# Patient Record
Sex: MALE | URBAN - METROPOLITAN AREA
[De-identification: names, ages, dates, MRNs, and addresses within clinical notes are randomized per-mention and may not be internally consistent; named-entity substitution may affect disease eponyms.]

---

## 2019-01-08 ENCOUNTER — TRANSFERRED RECORDS (OUTPATIENT)
Dept: HEALTH INFORMATION MANAGEMENT | Facility: CLINIC | Age: 34
End: 2019-01-08

## 2019-01-09 NOTE — TELEPHONE ENCOUNTER
FUTURE VISIT INFORMATION      FUTURE VISIT INFORMATION:    Date: 02/11/19    Time: 230pm    Location: [facility]  REFERRAL INFORMATION:    Referring provider:  JASON DACOSTA    Referring providers clinic:  [facility]    Reason for visit/diagnosis  lesion on sclera of R eye    RECORDS REQUESTED FROM:       Clinic name Comments Records Status Imaging Status   [facility] Notes transferred to HIM IN HIM

## 2019-02-11 ENCOUNTER — OFFICE VISIT (OUTPATIENT)
Dept: OPHTHALMOLOGY | Facility: CLINIC | Age: 34
End: 2019-02-11
Payer: COMMERCIAL

## 2019-02-11 ENCOUNTER — PRE VISIT (OUTPATIENT)
Dept: OPHTHALMOLOGY | Facility: CLINIC | Age: 34
End: 2019-02-11

## 2019-02-11 ENCOUNTER — DOCUMENTATION ONLY (OUTPATIENT)
Dept: OPHTHALMOLOGY | Facility: CLINIC | Age: 34
End: 2019-02-11

## 2019-02-11 DIAGNOSIS — H11.9 CONJUNCTIVAL LESION: Primary | ICD-10-CM

## 2019-02-11 RX ORDER — NICOTINE POLACRILEX 4 MG/1
20 GUM, CHEWING ORAL DAILY
COMMUNITY

## 2019-02-11 RX ORDER — CETIRIZINE HYDROCHLORIDE 10 MG/1
10 TABLET ORAL DAILY
COMMUNITY

## 2019-02-11 ASSESSMENT — SLIT LAMP EXAM - LIDS
COMMENTS: NORMAL
COMMENTS: NORMAL

## 2019-02-11 ASSESSMENT — CONF VISUAL FIELD
OD_NORMAL: 1
OS_NORMAL: 1
METHOD: COUNTING FINGERS

## 2019-02-11 ASSESSMENT — TONOMETRY
OS_IOP_MMHG: 19
OD_IOP_MMHG: 21
IOP_METHOD: ICARE

## 2019-02-11 ASSESSMENT — VISUAL ACUITY
OD_CC: 20/20
OS_CC: 20/20
METHOD: SNELLEN - LINEAR
CORRECTION_TYPE: CONTACTS

## 2019-02-11 NOTE — NURSING NOTE
Chief Complaints and History of Present Illnesses   Patient presents with     Consult For     Lesion on sclera OD     Chief Complaint(s) and History of Present Illness(es)     Consult For     Laterality: right eye    Onset: gradual    Onset: 5 years ago    Quality: blurred vision    Severity: moderate    Frequency: constantly    Timing: throughout the day    Course: gradually worsening    Associated symptoms: Negative for eye pain    Treatments tried: no treatments    Pain scale: 0/10    Comments: Lesion on sclera OD              Comments     Spot on sclera right eye medially, noticed 5 yrs ago. Maybe getting slightly bigger. No eye pain or irritation. Right eye gets blurry at end of day with CLs. Does not use eye drops    Crystal Perez COT 2:14 PM February 11, 2019

## 2019-02-11 NOTE — LETTER
2019         RE:  :  MRN: Melvin Cunningham  1985  [id number]     Dear Dr. Wili Rocha,    Thank you for asking me to see your patient, Melvin Cunningham, for an oculoplastic   consultation.  My assessment and plan are below.  For further details, please see my attached clinic note.      Assessment    Melvin Cunningham is a 34 year old male with the following diagnoses:   1. Conjunctival lesion - Right Eye       Pt has noted medial conj pigmented lesion near caruncle x ~5yrs  -possible slow progressive growth  - VA otherwise stable    Exam w/ right caruncular pigmented lesion, no feeder vessels    PLAN:  Excisional biopsy of pigmented lesion right eye       Again, thank you for allowing me to participate in the care of your patient.      Sincerely,    Levi Spears MD  Department of Ophthalmology and Visual Neurosciences  [facility]    CC: [address] [address]  VIA Facsimile: [phone]     Wili Rocha MD  [facility] [address] [place] [address]  VIA Facsimile: [phone]

## 2019-02-11 NOTE — PROGRESS NOTES
Met with patient to schedule surgery with Dr. Levi Spears.    Surgery was scheduled on 03/13 at [facility]  Patient will have H&P at [facility]  Post-Op care appointment was scheduled on 04/01  Patient is aware a / is needed day of surgery.   Patient received surgery packet has my direct contact information for any further questions.     Latrice, patient would like a cost estimate.  Please call him with information.

## 2019-02-11 NOTE — PROGRESS NOTES
Assessment    Melvin Cunningham is a 34 year old male with the following diagnoses:   1. Conjunctival lesion - Right Eye         Pt has noted medial conj pigmented lesion near caruncle x ~5yrs  -possible slow progressive growth  - VA otherwise stable    Exam w/ right caruncular pigmented lesion, no feeder vessels    PLAN:  Biopsy of pigmented lesion Right Eye      Deion Ken MD, RICARDO  Oculofacial Plastics and Orbit Surgery Fellow    Attending Physician Attestation:  Complete documentation of historical and exam elements from today's encounter can be found in the full encounter summary report (not reduplicated in this progress note).  I personally obtained the chief complaint(s) and history of present illness.  I confirmed and edited as necessary the review of systems, past medical/surgical history, family history, social history, and examination findings as documented by others; and I examined the patient myself.  I personally reviewed the relevant tests, images, and reports as documented above.  I formulated and edited as necessary the assessment and plan and discussed the findings and management plan with the patient and family. I personally reviewed the ophthalmic test(s) associated with this encounter, agree with the interpretation(s) as documented by the resident/fellow, and have edited the corresponding report(s) as necessary.   -Levi Spears MD    Today with Melvin Cunningham, I reviewed the indications, risks, benefits, and alternatives of the proposed surgical procedure including, but not limited to, failure obtain the desired result  and need for additional surgery, bleeding, infection, loss of vision, loss of the eye, and the remote possibility of permanent damage to any organ system or death with the use of anesthesia.  I provided multiple opportunities for the questions, answered all questions to the best of my ability, and confirmed that my answers and my discussion were understood.    - Levi Spears MD 2:57 PM 2/11/2019

## 2019-02-18 NOTE — PROGRESS NOTES
Patient called asking to reschedule to 02/27 at [place].   Patient was rescheduled,   Surgery packet was e-mailed due to timing.   PO was rescheduled to 03/11

## 2019-02-26 ENCOUNTER — ANESTHESIA EVENT (OUTPATIENT)
Dept: SURGERY | Facility: CLINIC | Age: 34
End: 2019-02-26
Payer: COMMERCIAL

## 2019-02-27 ENCOUNTER — HOSPITAL ENCOUNTER (OUTPATIENT)
Facility: CLINIC | Age: 34
Discharge: HOME | End: 2019-02-27
Attending: OPHTHALMOLOGY | Admitting: OPHTHALMOLOGY
Payer: COMMERCIAL

## 2019-02-27 ENCOUNTER — ANESTHESIA (OUTPATIENT)
Dept: SURGERY | Facility: CLINIC | Age: 34
End: 2019-02-27
Payer: COMMERCIAL

## 2019-02-27 VITALS
BODY MASS INDEX: 24 KG/M2 | OXYGEN SATURATION: 100 % | HEIGHT: 69 IN | TEMPERATURE: 97.5 F | WEIGHT: 162.04 LBS | HEART RATE: 68 BPM | RESPIRATION RATE: 16 BRPM | SYSTOLIC BLOOD PRESSURE: 119 MMHG | DIASTOLIC BLOOD PRESSURE: 82 MMHG

## 2019-02-27 DIAGNOSIS — Z98.890 POSTOPERATIVE EYE STATE: Primary | ICD-10-CM

## 2019-02-27 LAB — GLUCOSE BLDC GLUCOMTR-MCNC: 101 MG/DL (ref 70–99)

## 2019-02-27 PROCEDURE — 88304 TISSUE EXAM BY PATHOLOGIST: CPT | Mod: 26 | Performed by: OPHTHALMOLOGY

## 2019-02-27 PROCEDURE — 71000014 ZZH RECOVERY PHASE 1 LEVEL 2 FIRST HR: Performed by: OPHTHALMOLOGY

## 2019-02-27 PROCEDURE — 71000027 ZZH RECOVERY PHASE 2 EACH 15 MINS: Performed by: OPHTHALMOLOGY

## 2019-02-27 PROCEDURE — 36000059 ZZH SURGERY LEVEL 3 EA 15 ADDTL MIN UMMC: Performed by: OPHTHALMOLOGY

## 2019-02-27 PROCEDURE — 37000009 ZZH ANESTHESIA TECHNICAL FEE, EACH ADDTL 15 MIN: Performed by: OPHTHALMOLOGY

## 2019-02-27 PROCEDURE — 25000125 ZZHC RX 250: Performed by: OPHTHALMOLOGY

## 2019-02-27 PROCEDURE — 88341 IMHCHEM/IMCYTCHM EA ADD ANTB: CPT | Performed by: OPHTHALMOLOGY

## 2019-02-27 PROCEDURE — 25000566 ZZH SEVOFLURANE, EA 15 MIN: Performed by: OPHTHALMOLOGY

## 2019-02-27 PROCEDURE — 40000170 ZZH STATISTIC PRE-PROCEDURE ASSESSMENT II: Performed by: OPHTHALMOLOGY

## 2019-02-27 PROCEDURE — 36000057 ZZH SURGERY LEVEL 3 1ST 30 MIN - UMMC: Performed by: OPHTHALMOLOGY

## 2019-02-27 PROCEDURE — 27210794 ZZH OR GENERAL SUPPLY STERILE: Performed by: OPHTHALMOLOGY

## 2019-02-27 PROCEDURE — 25800030 ZZH RX IP 258 OP 636: Performed by: NURSE ANESTHETIST, CERTIFIED REGISTERED

## 2019-02-27 PROCEDURE — 88342 IMHCHEM/IMCYTCHM 1ST ANTB: CPT | Mod: 26 | Performed by: OPHTHALMOLOGY

## 2019-02-27 PROCEDURE — 88341 IMHCHEM/IMCYTCHM EA ADD ANTB: CPT | Mod: 26 | Performed by: OPHTHALMOLOGY

## 2019-02-27 PROCEDURE — 88342 IMHCHEM/IMCYTCHM 1ST ANTB: CPT | Performed by: OPHTHALMOLOGY

## 2019-02-27 PROCEDURE — 25000128 H RX IP 250 OP 636: Performed by: NURSE ANESTHETIST, CERTIFIED REGISTERED

## 2019-02-27 PROCEDURE — 82962 GLUCOSE BLOOD TEST: CPT

## 2019-02-27 PROCEDURE — 25000128 H RX IP 250 OP 636: Performed by: OPHTHALMOLOGY

## 2019-02-27 PROCEDURE — 37000008 ZZH ANESTHESIA TECHNICAL FEE, 1ST 30 MIN: Performed by: OPHTHALMOLOGY

## 2019-02-27 PROCEDURE — 88304 TISSUE EXAM BY PATHOLOGIST: CPT | Performed by: OPHTHALMOLOGY

## 2019-02-27 PROCEDURE — 25000125 ZZHC RX 250: Performed by: NURSE ANESTHETIST, CERTIFIED REGISTERED

## 2019-02-27 RX ORDER — PROPOFOL 10 MG/ML
INJECTION, EMULSION INTRAVENOUS PRN
Status: DISCONTINUED | OUTPATIENT
Start: 2019-02-27 | End: 2019-02-27

## 2019-02-27 RX ORDER — ONDANSETRON 2 MG/ML
INJECTION INTRAMUSCULAR; INTRAVENOUS PRN
Status: DISCONTINUED | OUTPATIENT
Start: 2019-02-27 | End: 2019-02-27

## 2019-02-27 RX ORDER — FENTANYL CITRATE 50 UG/ML
INJECTION, SOLUTION INTRAMUSCULAR; INTRAVENOUS PRN
Status: DISCONTINUED | OUTPATIENT
Start: 2019-02-27 | End: 2019-02-27

## 2019-02-27 RX ORDER — SODIUM CHLORIDE, SODIUM LACTATE, POTASSIUM CHLORIDE, CALCIUM CHLORIDE 600; 310; 30; 20 MG/100ML; MG/100ML; MG/100ML; MG/100ML
INJECTION, SOLUTION INTRAVENOUS CONTINUOUS PRN
Status: DISCONTINUED | OUTPATIENT
Start: 2019-02-27 | End: 2019-02-27

## 2019-02-27 RX ORDER — LIDOCAINE 40 MG/G
CREAM TOPICAL
Status: DISCONTINUED | OUTPATIENT
Start: 2019-02-27 | End: 2019-02-27 | Stop reason: HOSPADM

## 2019-02-27 RX ORDER — DEXAMETHASONE SODIUM PHOSPHATE 4 MG/ML
INJECTION, SOLUTION INTRA-ARTICULAR; INTRALESIONAL; INTRAMUSCULAR; INTRAVENOUS; SOFT TISSUE PRN
Status: DISCONTINUED | OUTPATIENT
Start: 2019-02-27 | End: 2019-02-27

## 2019-02-27 RX ORDER — TETRACAINE HYDROCHLORIDE 5 MG/ML
SOLUTION OPHTHALMIC PRN
Status: DISCONTINUED | OUTPATIENT
Start: 2019-02-27 | End: 2019-02-27 | Stop reason: HOSPADM

## 2019-02-27 RX ORDER — SODIUM CHLORIDE, SODIUM LACTATE, POTASSIUM CHLORIDE, CALCIUM CHLORIDE 600; 310; 30; 20 MG/100ML; MG/100ML; MG/100ML; MG/100ML
INJECTION, SOLUTION INTRAVENOUS CONTINUOUS
Status: DISCONTINUED | OUTPATIENT
Start: 2019-02-27 | End: 2019-02-27 | Stop reason: HOSPADM

## 2019-02-27 RX ORDER — HYDROCODONE BITARTRATE AND ACETAMINOPHEN 5; 325 MG/1; MG/1
1 TABLET ORAL EVERY 6 HOURS PRN
Qty: 4 TABLET | Refills: 0 | Status: SHIPPED | OUTPATIENT
Start: 2019-02-27

## 2019-02-27 RX ORDER — LIDOCAINE HYDROCHLORIDE 20 MG/ML
INJECTION, SOLUTION INFILTRATION; PERINEURAL PRN
Status: DISCONTINUED | OUTPATIENT
Start: 2019-02-27 | End: 2019-02-27

## 2019-02-27 RX ORDER — ERYTHROMYCIN 5 MG/G
OINTMENT OPHTHALMIC PRN
Status: DISCONTINUED | OUTPATIENT
Start: 2019-02-27 | End: 2019-02-27 | Stop reason: HOSPADM

## 2019-02-27 RX ORDER — NEOMYCIN POLYMYXIN B SULFATES AND DEXAMETHASONE 3.5; 10000; 1 MG/ML; [USP'U]/ML; MG/ML
1 SUSPENSION/ DROPS OPHTHALMIC 4 TIMES DAILY
Qty: 5 ML | Refills: 0 | Status: SHIPPED | OUTPATIENT
Start: 2019-02-27

## 2019-02-27 RX ADMIN — SODIUM CHLORIDE, POTASSIUM CHLORIDE, SODIUM LACTATE AND CALCIUM CHLORIDE: 600; 310; 30; 20 INJECTION, SOLUTION INTRAVENOUS at 09:35

## 2019-02-27 RX ADMIN — PROPOFOL 50 MG: 10 INJECTION, EMULSION INTRAVENOUS at 09:39

## 2019-02-27 RX ADMIN — FENTANYL CITRATE 50 MCG: 50 INJECTION, SOLUTION INTRAMUSCULAR; INTRAVENOUS at 09:46

## 2019-02-27 RX ADMIN — DEXAMETHASONE SODIUM PHOSPHATE 8 MG: 4 INJECTION, SOLUTION INTRAMUSCULAR; INTRAVENOUS at 09:44

## 2019-02-27 RX ADMIN — PROPOFOL 200 MG: 10 INJECTION, EMULSION INTRAVENOUS at 09:38

## 2019-02-27 RX ADMIN — FENTANYL CITRATE 50 MCG: 50 INJECTION, SOLUTION INTRAMUSCULAR; INTRAVENOUS at 09:38

## 2019-02-27 RX ADMIN — ONDANSETRON 4 MG: 2 INJECTION INTRAMUSCULAR; INTRAVENOUS at 09:44

## 2019-02-27 RX ADMIN — LIDOCAINE HYDROCHLORIDE 100 MG: 20 INJECTION, SOLUTION INFILTRATION; PERINEURAL at 09:38

## 2019-02-27 ASSESSMENT — MIFFLIN-ST. JEOR: SCORE: 1660.38

## 2019-02-27 NOTE — ANESTHESIA POSTPROCEDURE EVALUATION
Anesthesia POST Procedure Evaluation    Patient: Melvin Cunningham   MRN:     [id number] Gender:   male   Age:    34 year old :      1985        Preoperative Diagnosis: Conjuctival Lesion Right Eye, Pigmented Lesion   Procedure(s):  Excision Of Conjunctival Right Eye Lesion   Postop Comments: No value filed.       Anesthesia Type:  General    Reportable Event: NO     PAIN: Uncomplicated   Sign Out status: Comfortable, Well controlled pain     PONV: No PONV   Sign Out status:  No Nausea or Vomiting     Neuro/Psych: Uneventful perioperative course   Sign Out Status: Preoperative baseline; Age appropriate mentation     Airway/Resp.: Uneventful perioperative course   Sign Out Status: Non labored breathing, age appropriate RR; Resp. Status within EXPECTED Parameters     CV: Uneventful perioperative course   Sign Out status: Appropriate BP and perfusion indices; Appropriate HR/Rhythm     Disposition:   Sign Out in:  PACU  Disposition:  Phase II; Home  Recovery Course: Uneventful  Follow-Up: Not required           Last Anesthesia Record Vitals:  CRNA VITALS  2019 0936 - 2019 1036      2019             Pulse:  52    SpO2:  100 %    Resp Rate (observed):  12          Last PACU/Preop Vitals:  Vitals:    19 1100 19 1115 19 1130   BP: 128/85 119/82    Pulse: 54 68    Resp: 16     Temp:  36.4  C (97.5  F)    SpO2: 100% 100% 100%         Electronically Signed By: Britney Oconnell MD, 2019, 1:01 PM

## 2019-02-27 NOTE — OP NOTE
PREOPERATIVE DIAGNOSES: Right conjunctival mass.   POSTOPERATIVE DIAGNOSES:  Right conjunctival mass.   PROCEDURE: Right conjunctival excisional biopsy.   ANESTHESIA: General with local infiltration of a 50/50 mixture of 2% lidocaine with epinephrine and 0.5% Marcaine.   SURGEON: Subha Spears MD.  ASSISTANT:  Deion Ken MD and  Killian Aguila MD   COMPLICATIONS: None.   ESTIMATED BLOOD LOSS: Less than 5 mL.   SPECIMEN: Conjuntival mass for histopathology  HISTORY AND INDICATIONS: Melvin Cunningham presented with a  conjunctival lesion on the right side. After the risks, benefits and alternatives to the proposed procedure were explained, informed consent was obtained.   DESCRIPTION OF PROCEDURE: The patient was brought to the operating room and placed supine on the operating table.General anesthesia was given. Topical anesthetic was placed in both eyes. Subconjunctival infiltration of 2% lidocaine with epinephrine mixed 50/50 with 0.5% Marcaine was performed. The area  was prepped and draped in the typical sterile fashion for oculoplastic surgery. Attention was directed to right  side. The lower lid was retracted and a Rajwinder lid plate used to protect the globe. The conjunctival lesion was excised with a Waylon scissors. Hemostasis was obtained with bipolar cautery. Antibiotic ophthalmic ointment was placed in the eye. The patient tolerated the procedure well. Melvin J Aimeenia  Left the operating room in stable condition.     SUBHA SPEARS MD

## 2019-02-27 NOTE — DISCHARGE INSTRUCTIONS
Post-operative Instructions    Ophthalmic Plastic and Reconstructive Surgery  Levi Spears M.D.      All instructions apply to the operated eye(s) or eyelid(s)      What to expect after surgery:    There will be some swelling, bruising, and likely a black eye (even into the lower eyelids and cheeks). Also expect crusting and discharge from the eye and/or incisions.     A small amount of surface bleeding is normal for the first 48 hours after surgery.    You may notice some bloody tears for the first few days after surgery. This is normal.    Your eye(s) and eyelid(s) may be painful and tender. This is normal after surgery. Use the pain medication as prescribed. If your pain does not improve despite the medication, contact the office.    Wound care and personal care:    If a patch or bandage has been placed, please leave this in place until seen in clinic. Prevent the bandage from getting wet.     Apply ice compresses 15 minutes on 15 minutes off while awake for the first 2 days after surgery, then switch to warm compresses 4 times a day until seen by your physician.     For warm packs you can place a cup of dry uncooked rice in a clean cotton sock. Place sock in microwave 30 seconds to one minute. Next place the warm sock into a plastic bag and wrap the bag with clean warm wet washcloth and place over operated eye.      You may shower or wash your hair the day after surgery. Do not bathe or go swimming for 1 week to prevent contamination of your wounds.    Do not apply make-up to the eyes or eyelids for 2 weeks after surgery.      Activity restrictions and driving:    Avoid heavy lifting, bending, exercise or strenuous activity for 1 week after surgery.    You may resume other activities and return to work as tolerated.    You may not resume driving until have you stopped using narcotic pain medications(such as Norco, Percocet, Tylenol #3).    Medications:    Restart all your regular home medications and eye  drops today. If you take Plavix or Aspirin on a regular basis, wait for 3 days after your surgery before restarting these in order to decrease the risk of bleeding complications.    Avoid aspirin and aspirin-like medications (Motrin, Aleve, Ibuprofen, Aleshia-Webb etc) for 5 days to reduce the risk of bleeding. You may take Tylenol (acetaminophen) for pain.    In addition to your home medications, take the following post-operative medications as prescribed by your physician:      Instill eye drops (Maxitrol) four times a day until the bottle finished.     Take 1 to 2 pain pills (norco or tylenol 3 as prescribed) as needed for pain up to every 4 hours.    The pain pills may make you drowsy. You must not drive a car, operate heavy machinery or drink alcohol while taking them.    The pain pills may cause constipation and nausea. Take them with some food to prevent a stomach upset. If you continue to experience nausea, call your physician.      WARNING: All the prescription pain medications listed above contain Tylenol (acetaminophen). You must not take more than 4,000 mg of acetaminophen per 24-hour period. This is equivalent to 6 tablets of Darvocet, 8 tablets of Vicodin, or 12 tablets of Norco, Percocet or Tylenol #3. If you take other over-the-counter medications containing acetaminophen, you must take the amount of acetaminophen into account and reduce the number of prescribed pain pills accordingly.    Contact information and follow-up:    Return to the Eye Clinic for a follow-up appointment with your physician as  scheduled. If no appointment has been scheduled, call [phone] for an  appointment with Dr. Spears within 1 to 2 weeks from your date of surgery.      For severe pain, bleeding, or loss of vision, call the Eye Clinic at [phone].    After hours or on weekends and holidays, call [phone] and ask to speak with the ophthalmologist on call.  Same-Day Surgery   Adult Discharge Orders &  Instructions     For 24 hours after surgery:  1. Get plenty of rest.  A responsible adult must stay with you for at least 24 hours after you leave the hospital.   2. Pain medication can slow your reflexes. Do not drive or use heavy equipment.  If you have weakness or tingling, don't drive or use heavy equipment until this feeling goes away.  3. Mixing alcohol and pain medication can cause dizziness and slow your breathing. It can even be fatal. Do not drink alcohol while taking pain medication.  4. Avoid strenuous or risky activities.  Ask for help when climbing stairs.   5. You may feel lightheaded.  If so, sit for a few minutes before standing.  Have someone help you get up.   6. If you have nausea (feel sick to your stomach), drink only clear liquids such as apple juice, ginger ale, broth or 7-Up.  Rest may also help.  Be sure to drink enough fluids.  Move to a regular diet as you feel able. Take pain medications with a small amount of solid food, such as toast or crackers, to avoid nausea.   7. A slight fever is normal. Call the doctor if your fever is over 100 F (37.7 C) (taken under the tongue) or lasts longer than 24 hours.  8. You may have a dry mouth, muscle aches, trouble sleeping or a sore throat.  These symptoms should go away after 24 hours.  9. Do not make important or legal decisions.   Pain Management:      1. Take pain medication (if prescribed) for pain as directed by your physician.        2. WARNING: If the pain medication you have been prescribed contains Tylenol  (acetaminophen), DO NOT take additional doses of Tylenol (acetaminophen).     Call your doctor for any of the followin.  Signs of infection (fever, growing tenderness at the surgery site, severe pain, a large amount of drainage or bleeding, foul-smelling drainage, redness, swelling).    2.  It has been over 8 to 10 hours since surgery and you are still not able to urinate (pee).    3.  Headache for over 24 hours.    4.  Numbness,  tingling or weakness the day after surgery (if you had spinal anesthesia).  To contact a doctor, call _____________________________________ or:      [phone] and ask for the Resident On Call for:          __________________________________________ (answered 24 hours a day)      Emergency Department:  [facility]: [phone]  [facility]: [phone]               Rev. 10/2014

## 2019-02-27 NOTE — BRIEF OP NOTE
[facility] Brief Operative Note    Pre-operative diagnosis: Conjuctival Lesion Right Eye, Pigmented Lesion   Post-operative diagnosis Same   Procedure: Procedure(s):  Excision Of Conjunctival Right Eye Lesion   Surgeon: Levi Spears M.D.      Assistants(s): Deion Ken M.D.   Estimated blood loss: Less than 10 mL   Specimens: None   Findings: As expected

## 2019-02-27 NOTE — ANESTHESIA CARE TRANSFER NOTE
Patient: Melvin Cunningham    Procedure(s):  Excision Of Conjunctival Right Eye Lesion    Diagnosis: Conjuctival Lesion Right Eye, Pigmented Lesion  Diagnosis Additional Information: No value filed.    Anesthesia Type:   No value filed.     Note:  Airway :Face Mask  Patient transferred to:PACU  Handoff Report: Identifed the Patient, Identified the Reponsible Provider, Reviewed the pertinent medical history, Discussed the surgical course, Reviewed Intra-OP anesthesia mangement and issues during anesthesia, Set expectations for post-procedure period and Allowed opportunity for questions and acknowledgement of understanding      Vitals: (Last set prior to Anesthesia Care Transfer)    CRNA VITALS  2/27/2019 0935 - 2/27/2019 1005      2/27/2019             Pulse:  57    SpO2:  99 %    Resp Rate (observed):  11                Electronically Signed By: ROYAL Springer CRNA  February 27, 2019  10:05 AM

## 2019-02-27 NOTE — ANESTHESIA PREPROCEDURE EVALUATION
Anesthesia Pre-Procedure Evaluation    Patient: Melvin Cunningham   MRN:     [id number] Gender:   male   Age:    34 year old :      1985        Preoperative Diagnosis: Conjuctival Lesion Right Eye, Pigmented Lesion   Procedure(s):  Excision Of Conjunctival Right Eye Lesion     History reviewed. No pertinent past medical history.   History reviewed. No pertinent surgical history.       Anesthesia Evaluation     . Pt has had prior anesthetic. Type: General    No history of anesthetic complications          ROS/MED HX    ENT/Pulmonary:  - neg pulmonary ROS     Neurologic:  - neg neurologic ROS     Cardiovascular:  - neg cardiovascular ROS   (+) ----. : . . . :. . No previous cardiac testing       METS/Exercise Tolerance:  >4 METS   Hematologic:  - neg hematologic  ROS       Musculoskeletal:  - neg musculoskeletal ROS       GI/Hepatic:  - neg GI/hepatic ROS       Renal/Genitourinary:  - ROS Renal section negative       Endo:  - neg endo ROS       Psychiatric:  - neg psychiatric ROS       Infectious Disease:  - neg infectious disease ROS       Malignancy:      - no malignancy   Other:    - neg other ROS                     PHYSICAL EXAM:   Mental Status/Neuro: A/A/O   Airway: Facies: Feasible  Mallampati: I  Mouth/Opening: Full  TM distance: > 6 cm  Neck ROM: Full   Respiratory: Auscultation: CTAB     Resp. Rate: Normal     Resp. Effort: Normal      CV: Rhythm: Regular  Rate: Age appropriate  Heart: Normal Sounds   Comments:      Dental: Normal                  Lab Results   Component Value Date    WBC 5.7 10/08/2010    HGB 16.1 10/08/2010    HCT 44.7 10/08/2010     10/08/2010     10/08/2010    POTASSIUM 4.0 10/08/2010    CHLORIDE 105 10/08/2010    CO2 26 10/08/2010    BUN 9 10/08/2010    CR 0.89 10/08/2010    GLC 84 10/08/2010    MARLENY 9.1 10/08/2010    ALBUMIN 4.4 2007    PROTTOTAL 7.9 2007    ALT 44 2007    AST 25 2007    ALKPHOS 130 2007    BILITOTAL 0.3 2007     INR 1.08 02/24/2007       Preop Vitals  BP Readings from Last 3 Encounters:   No data found for BP    Pulse Readings from Last 3 Encounters:   No data found for Pulse      Resp Readings from Last 3 Encounters:   No data found for Resp    SpO2 Readings from Last 3 Encounters:   No data found for SpO2      Temp Readings from Last 1 Encounters:   No data found for Temp    Ht Readings from Last 1 Encounters:   No data found for Ht      Wt Readings from Last 1 Encounters:   No data found for Wt    There is no height or weight on file to calculate BMI.     LDA:            Assessment:   ASA SCORE: 1       Documentation: H&P complete; Preop Testing complete; Consents complete   Proceeding: Proceed without further delay  Tobacco Use:  NO Active use of Tobacco/UNKNOWN Tobacco use status     Plan:   Anes. Type:  General   Pre-Induction: Acetaminophen PO   Induction:  IV (Standard)   Airway: LMA   Access/Monitoring: PIV   Maintenance: Balanced   Emergence: Procedure Site   Logistics: Same Day Surgery     Postop Pain/Sedation Strategy:  Standard-Options: Opioids PRN     PONV Management:  Adult Risk Factors:, Non-Smoker, Postop Opioids  Prevention: Ondansetron; Dexamethasone     CONSENT: Direct conversation   Plan and risks discussed with: Patient                            Britney Oconnell MD

## 2019-02-28 ENCOUNTER — TELEPHONE (OUTPATIENT)
Dept: OPHTHALMOLOGY | Facility: CLINIC | Age: 34
End: 2019-02-28

## 2019-02-28 NOTE — TELEPHONE ENCOUNTER
Telephone call to Melvin Cunningham    Doing well with no pain, good vision, and no bleeding. All questions were answered, he is doing well, and postoperative care was reviewed.  A postop appointment has been scheduled.    Levi Spears MD

## 2019-03-04 LAB — COPATH REPORT: NORMAL

## 2019-03-06 ENCOUNTER — TELEPHONE (OUTPATIENT)
Dept: OPHTHALMOLOGY | Facility: CLINIC | Age: 34
End: 2019-03-06

## 2019-03-06 NOTE — TELEPHONE ENCOUNTER
Melvin Cunningham left a voicemail asking for the results of his biopsy from his surgery on  2/27/19.  Please contact patient with results at [phone].

## 2019-03-07 NOTE — RESULT ENCOUNTER NOTE
Patient called requesting results of pathology. I reviewed his results with and let him know Dr. Spears would look at the results as well and discuss with patient at his post op appointment 3/11/19.  Libby Fink RN 9:32 AM 03/07/19

## 2019-03-08 NOTE — TELEPHONE ENCOUNTER
Spoke w/ patient, discussed pathology results.  Pt will call back with any questions, concerns, or recurrence.    Deion Ken MD, RICARDO  Oculofacial Plastics and Orbit Surgery Fellow

## 2019-03-11 ENCOUNTER — OFFICE VISIT (OUTPATIENT)
Dept: OPHTHALMOLOGY | Facility: CLINIC | Age: 34
End: 2019-03-11
Payer: COMMERCIAL

## 2019-03-11 DIAGNOSIS — Z98.890 POSTOPERATIVE EYE STATE: ICD-10-CM

## 2019-03-11 DIAGNOSIS — H11.9 CONJUNCTIVAL LESION: Primary | ICD-10-CM

## 2019-03-11 ASSESSMENT — VISUAL ACUITY
CORRECTION_TYPE: CONTACTS
OD_CC: 20/20
OS_CC: 20/20
METHOD: SNELLEN - LINEAR

## 2019-03-11 ASSESSMENT — SLIT LAMP EXAM - LIDS
COMMENTS: NORMAL
COMMENTS: NORMAL

## 2019-03-11 ASSESSMENT — TONOMETRY
OS_IOP_MMHG: 16
OD_IOP_MMHG: 19
IOP_METHOD: ICARE

## 2019-03-11 NOTE — LETTER
3/11/2019         RE:  :  MRN: Melvin Cunningham  1985  [id number]     Dear Dr. Wili Rocha,    Your patient, Melvin Cunningham, returned for oculoplastic follow up. My assessment and plan are below.  For further details, please see my attached clinic note.    Assessment    Melvin Cunningham is a 34 year old male with the following diagnoses:   1. Conjunctival lesion - Right Eye    2. Postoperative eye state      S/p Conjunctival mass biopsy right eye   -Incisions healing well  - path confirms nevus  PLAN:  Use artificial tears  Return to clinic prn       Again, thank you for allowing me to participate in the care of your patient.      Sincerely,    Levi Spears MD  Department of Ophthalmology and Visual Neurosciences  [facility]      CC: [address] [address]  VIA Facsimile: [phone]     Wili Rocha MD  [facility] [address] [place] [address]  VIA Facsimile: [phone]

## 2019-03-11 NOTE — NURSING NOTE
Chief Complaints and History of Present Illnesses   Patient presents with     Post Op (Ophthalmology) Right Eye     Chief Complaint(s) and History of Present Illness(es)     Post Op (Ophthalmology) Right Eye     Laterality: right eye    Onset: weeks ago    Frequency: constantly    Course: stable    Associated symptoms: Negative for eye pain    Pain scale: 0/10              Comments     S/p Excision Of Conjunctival Right Eye Lesion 2/27/19. Patient is doing well since surgery. No eye pain. No vision changes. Finished with eye drops.    Crystal Perez COT 8:18 AM March 11, 2019

## 2019-03-11 NOTE — PROGRESS NOTES
Assessment    Melvin Cunningham is a 34 year old male with the following diagnoses:   1. Conjunctival lesion - Right Eye    2. Postoperative eye state         S/p Conjunctival mass biopsy right eye   -Incisions healing well  - path confirms nevus        PLAN:    Use artificial tears    Return to clinic prn    Deion Ken MD, RICARDO  Oculofacial Plastics and Orbit Surgery Fellow    Attending Physician Attestation:  Complete documentation of historical and exam elements from today's encounter can be found in the full encounter summary report (not reduplicated in this progress note).  I personally obtained the chief complaint(s) and history of present illness.  I confirmed and edited as necessary the review of systems, past medical/surgical history, family history, social history, and examination findings as documented by others; and I examined the patient myself.  I personally reviewed the relevant tests, images, and reports as documented above.  I formulated and edited as necessary the assessment and plan and discussed the findings and management plan with the patient and family. I personally reviewed the ophthalmic test(s) associated with this encounter, agree with the interpretation(s) as documented by the resident/fellow, and have edited the corresponding report(s) as necessary.   -Levi Spears MD

## 2025-09-04 ENCOUNTER — OFFICE VISIT (OUTPATIENT)
Dept: FAMILY MEDICINE | Facility: CLINIC | Age: 40
End: 2025-09-04
Payer: COMMERCIAL

## 2025-09-04 VITALS
HEART RATE: 65 BPM | SYSTOLIC BLOOD PRESSURE: 120 MMHG | RESPIRATION RATE: 15 BRPM | OXYGEN SATURATION: 99 % | BODY MASS INDEX: 24.14 KG/M2 | HEIGHT: 69 IN | WEIGHT: 163 LBS | TEMPERATURE: 97.9 F | DIASTOLIC BLOOD PRESSURE: 81 MMHG

## 2025-09-04 DIAGNOSIS — K13.79 LUMP IN MOUTH: Primary | ICD-10-CM

## 2025-09-04 LAB
ERYTHROCYTE [DISTWIDTH] IN BLOOD BY AUTOMATED COUNT: 11.8 % (ref 10–15)
HCT VFR BLD AUTO: 40.2 % (ref 40–53)
HGB BLD-MCNC: 14.8 G/DL (ref 13.3–17.7)
MCH RBC QN AUTO: 31.9 PG (ref 26.5–33)
MCHC RBC AUTO-ENTMCNC: 36.8 G/DL (ref 31.5–36.5)
MCV RBC AUTO: 86.6 FL (ref 78–100)
PLATELET # BLD AUTO: 218 10E3/UL (ref 150–450)
RBC # BLD AUTO: 4.64 10E6/UL (ref 4.4–5.9)
WBC # BLD AUTO: 5.81 10E3/UL (ref 4–11)

## 2025-09-04 ASSESSMENT — PAIN SCALES - GENERAL: PAINLEVEL_OUTOF10: NO PAIN (0)

## (undated) DEVICE — SYR 03ML LL W/O NDL 309657

## (undated) DEVICE — SU VICRYL 6-0 S-14DA 18" UND J670G

## (undated) DEVICE — PEN MARKING SKIN VISIMARK 1424SR

## (undated) DEVICE — BLADE KNIFE SURG 15 371115

## (undated) DEVICE — POSITIONER ARMBOARD FOAM 1PAIR LF FP-ARMB1

## (undated) DEVICE — ESU HOLSTER PLASTIC DISP E2400

## (undated) DEVICE — LINEN TOWEL PACK X5 5464

## (undated) DEVICE — ESU EYE HIGH TEMP 65410-183

## (undated) DEVICE — NDL 30GA 0.5" 305106

## (undated) DEVICE — GLOVE PROTEXIS MICRO 7.5  2D73PM75

## (undated) DEVICE — PACK MINOR EYE

## (undated) DEVICE — EYE PREP BETADINE 5% SOLUTION 30ML 0065-0411-30

## (undated) RX ORDER — GLYCOPYRROLATE 0.2 MG/ML
INJECTION, SOLUTION INTRAMUSCULAR; INTRAVENOUS
Status: DISPENSED
Start: 2019-02-27

## (undated) RX ORDER — LIDOCAINE HYDROCHLORIDE 20 MG/ML
INJECTION, SOLUTION EPIDURAL; INFILTRATION; INTRACAUDAL; PERINEURAL
Status: DISPENSED
Start: 2019-02-27

## (undated) RX ORDER — FENTANYL CITRATE 50 UG/ML
INJECTION, SOLUTION INTRAMUSCULAR; INTRAVENOUS
Status: DISPENSED
Start: 2019-02-27